# Patient Record
Sex: MALE | Race: BLACK OR AFRICAN AMERICAN
[De-identification: names, ages, dates, MRNs, and addresses within clinical notes are randomized per-mention and may not be internally consistent; named-entity substitution may affect disease eponyms.]

---

## 2021-10-01 ENCOUNTER — HOSPITAL ENCOUNTER (EMERGENCY)
Dept: HOSPITAL 61 - ER | Age: 21
Discharge: HOME | End: 2021-10-01
Payer: SELF-PAY

## 2021-10-01 VITALS — BODY MASS INDEX: 19.27 KG/M2 | WEIGHT: 122.8 LBS | HEIGHT: 67 IN

## 2021-10-01 VITALS — DIASTOLIC BLOOD PRESSURE: 67 MMHG | SYSTOLIC BLOOD PRESSURE: 135 MMHG
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
## 2021-10-01 DIAGNOSIS — S39.92XA: Primary | ICD-10-CM

## 2021-10-01 DIAGNOSIS — G89.29: ICD-10-CM
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
## 2021-10-01 DIAGNOSIS — F17.200: ICD-10-CM

## 2021-10-01 PROCEDURE — 72131 CT LUMBAR SPINE W/O DYE: CPT

## 2021-10-01 PROCEDURE — 99284 EMERGENCY DEPT VISIT MOD MDM: CPT

## 2021-10-01 PROCEDURE — 96372 THER/PROPH/DIAG INJ SC/IM: CPT

## 2021-10-01 NOTE — RAD
PQRS Compliance Statement:



One or more of the following individualized dose reduction techniques were utilized for this examinat
ion:

1. Automated exposure control

2. Adjustment of the mA and/or kV according to patient size

3. Use of iterative reconstruction technique



Exam performed: CT scan lumbar spine.



Indication: Fall, pain



Date of Service: 10/1/2021. Comparison: None available.



Technique: Contiguous helical acquisitions of  the  lumbar spine are obtained. Sagittal and coronal r
eformatted images  are obtained and reviewed.

 

CT lumbar spine findings:



There is minimal retrolisthesis of L5 over S1, the remainder sagittal alignment is preserved.. Five n
onrib-bearing vertebral bodies are identified. The vertebral body heights and intervertebral disc spa
deann are maintained. There is no acute compression fracture. No prevertebral soft tissue swelling or m
ass is detected. The aorta is normal.



Impression:



1. No acute abnormality seen in the CT lumbar spine.



Electronically signed by: Lissette Maki MD (10/1/2021 7:50 AM) [facility]
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
